# Patient Record
Sex: FEMALE | Race: ASIAN | URBAN - METROPOLITAN AREA
[De-identification: names, ages, dates, MRNs, and addresses within clinical notes are randomized per-mention and may not be internally consistent; named-entity substitution may affect disease eponyms.]

---

## 2022-01-19 ENCOUNTER — OFFICE VISIT (OUTPATIENT)
Dept: ORTHOPEDIC SURGERY | Age: 52
End: 2022-01-19
Payer: COMMERCIAL

## 2022-01-19 VITALS — WEIGHT: 140 LBS | HEIGHT: 61 IN | BODY MASS INDEX: 26.43 KG/M2

## 2022-01-19 DIAGNOSIS — M25.532 LEFT WRIST PAIN: Primary | ICD-10-CM

## 2022-01-19 DIAGNOSIS — M77.8 LEFT WRIST TENDINITIS: ICD-10-CM

## 2022-01-19 PROCEDURE — 99203 OFFICE O/P NEW LOW 30 MIN: CPT | Performed by: ORTHOPAEDIC SURGERY

## 2022-01-19 PROCEDURE — G8484 FLU IMMUNIZE NO ADMIN: HCPCS | Performed by: ORTHOPAEDIC SURGERY

## 2022-01-19 PROCEDURE — 1036F TOBACCO NON-USER: CPT | Performed by: ORTHOPAEDIC SURGERY

## 2022-01-19 PROCEDURE — 3017F COLORECTAL CA SCREEN DOC REV: CPT | Performed by: ORTHOPAEDIC SURGERY

## 2022-01-19 PROCEDURE — G8419 CALC BMI OUT NRM PARAM NOF/U: HCPCS | Performed by: ORTHOPAEDIC SURGERY

## 2022-01-19 PROCEDURE — G8427 DOCREV CUR MEDS BY ELIG CLIN: HCPCS | Performed by: ORTHOPAEDIC SURGERY

## 2022-01-19 RX ORDER — NAPROXEN 500 MG/1
500 TABLET ORAL 2 TIMES DAILY WITH MEALS
Qty: 60 TABLET | Refills: 0 | Status: SHIPPED | OUTPATIENT
Start: 2022-01-19 | End: 2022-02-18

## 2022-01-19 NOTE — PROGRESS NOTES
CHIEF COMPLAINT: Left wrist pain/ DeQuervain Tenosynovitis    HISTORY:  Ms. Phylicia Kulkarni is 45 y.o.  female right handed presents today for the first visit for evaluation of a left wrist pain with which started in August 2021 and is worsening. Denies trauma or injury. Rates pain a 10/10 VAS. The patient is complaining of left wrist pain. This is worse with hand grasp and thumb movement and nothing makes pain better. She has used a thumb spica brace intermittently with no significant improvement. No other complaint. Denies smoking. 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 Past Medical History:   Diagnosis Date    Environmental allergies     Perimenopausal        Past Surgical History:   Procedure Laterality Date    GALLBLADDER SURGERY  1994       Social History     Socioeconomic History    Marital status: Single     Spouse name: Not on file    Number of children: Not on file    Years of education: Not on file    Highest education level: Not on file   Occupational History    Occupation:       Comment: [employer]   Tobacco Use    Smoking status: Never Smoker    Smokeless tobacco: Never Used   Vaping Use    Vaping Use: Never used   Substance and Sexual Activity    Alcohol use: No     Alcohol/week: 0.0 standard drinks    Drug use: No    Sexual activity: Not Currently   Other Topics Concern    Not on file   Social History Narrative    Not on file     Social Determinants of Health     Financial Resource Strain:     Difficulty of Paying Living Expenses: Not on file   Food Insecurity:     Worried About [address] in the Last Year: Not on file    [address] in the Last Year: Not on file   Transportation Needs:     Lack of Transportation (Medical): Not on file    Lack of Transportation (Non-Medical):  Not on file   Physical Activity:     Days of Exercise per Week: Not on file    Minutes of Exercise per Session: Not on file   Stress:     Feeling of Stress : Not on file   Social Connections:     Frequency of Communication with Friends and Family: Not on file    Frequency of Social Gatherings with Friends and Family: Not on file    Attends [employer] Services: Not on file    Active Member of Clubs or Organizations: Not on file    Attends Club or Organization Meetings: Not on file    Marital Status: Not on file   Intimate Partner Violence:     Fear of Current or Ex-Partner: Not on file    Emotionally Abused: Not on file    Physically Abused: Not on file    Sexually Abused: Not on file   Housing Stability:     Unable to Pay for Housing in the Last Year: Not on file    Number of [place] in the Last Year: Not on file    Unstable Housing in the Last Year: Not on file       History reviewed. No pertinent family history. Current Outpatient Medications on File Prior to Visit   Medication Sig Dispense Refill    Cetirizine HCl (ZYRTEC PO) Take by mouth      vitamin B-12 (CYANOCOBALAMIN) 500 MCG tablet Take 500 mcg by mouth daily      naproxen (NAPROSYN) 500 MG tablet Take 1 tablet by mouth 2 times daily (with meals) 60 tablet 5    acetaminophen (TYLENOL) 500 MG tablet Take 2 tablets by mouth every 6 hours as needed for Pain Maximum dose- 8 tablets/24 hours. 120 tablet 3     No current facility-administered medications on file prior to visit. Pertinent items are noted in HPI  Review of systems reviewed from Patient History Form and available in the patient's chart under the Media tab. No change noted. PHYSICAL EXAMINATION:  Ms. Estrella Otto is a very pleasant 45 y.o.  female who presents today in no acute distress, awake, alert, and oriented. She is well dressed, nourished and  groomed. Patient with normal affect. Height is  5' 1\" (1.549 m), weight is 140 lb (63.5 kg), Body mass index is 26.45 kg/m². Resting respiratory rate is 16. Examination of the gait, showed that the patient walks with no limp .   Examination of both upper extremities showing a normal range of motion of the left hand compare to the other side. There is no swelling that can be seen. Good strength, and no instability both upper and lower extremities. There is positive tenderness to palpation at the 1st wrist dorsal compartment, and positive Finkelstein test for tenosynovitis. IMAGING: Essie Mullins were reviewed, taken today in the office, 3 views of the left wrist, and showed no fracture, no other abnormalities. IMPRESSION: Left wrist pain/ DeQuervain Tenosynovitis    PLAN:  I assured the patient that the xray is negative for acute fracture. The patient can take NSAIDs PRN, Rx sent and instructed in care and recommended wearing the thumb spica brace. Follow-up in 6 weeks if her pain is not improved she may benefit from cortisone injection left wrist 1st wrist dorsal extensor compartment.       Bia Rodgers MD

## 2024-05-21 ENCOUNTER — TELEPHONE (OUTPATIENT)
Dept: UROGYNECOLOGY | Age: 54
End: 2024-05-21

## 2024-05-21 NOTE — TELEPHONE ENCOUNTER
Pt's daughter Marilou called to schedule appointment for stress incontinence. Marilou lives in [facility], and will help pt (who lives here in [place]) to fill out paperwork. Patient Speaks [place].  Marilou will fax back completed documentation before patients appointment. Patient verbalizes understanding of all of the above, and has no further questions or concerns at this time. Encouraged to call back the office should any questions/concerns arise. 5/21/2024 at 10:34 AM.